# Patient Record
Sex: MALE | Race: WHITE | Employment: UNEMPLOYED | URBAN - METROPOLITAN AREA
[De-identification: names, ages, dates, MRNs, and addresses within clinical notes are randomized per-mention and may not be internally consistent; named-entity substitution may affect disease eponyms.]

---

## 2017-05-16 ENCOUNTER — HOSPITAL ENCOUNTER (EMERGENCY)
Age: 37
Discharge: HOME | End: 2017-05-16
Attending: EMERGENCY MEDICINE
Payer: SELF-PAY

## 2017-05-16 VITALS
RESPIRATION RATE: 14 BRPM | SYSTOLIC BLOOD PRESSURE: 109 MMHG | HEART RATE: 121 BPM | TEMPERATURE: 98.1 F | BODY MASS INDEX: 19.29 KG/M2 | DIASTOLIC BLOOD PRESSURE: 61 MMHG | HEIGHT: 66 IN | WEIGHT: 120 LBS | OXYGEN SATURATION: 96 %

## 2017-05-16 DIAGNOSIS — F19.10 DRUG ABUSE (HCC): Primary | ICD-10-CM

## 2017-05-16 LAB
ALBUMIN SERPL BCP-MCNC: 4 G/DL (ref 3.5–5)
ALBUMIN/GLOB SERPL: 1.4 {RATIO} (ref 1.2–3.5)
ALP SERPL-CCNC: 98 U/L (ref 50–136)
ALT SERPL-CCNC: 31 U/L (ref 12–65)
ANION GAP BLD CALC-SCNC: 12 MMOL/L (ref 7–16)
APAP SERPL-MCNC: 2 UG/ML (ref 10–30)
AST SERPL W P-5'-P-CCNC: 23 U/L (ref 15–37)
BASOPHILS # BLD AUTO: 0 K/UL (ref 0–0.2)
BASOPHILS # BLD: 0 % (ref 0–2)
BILIRUB SERPL-MCNC: 0.2 MG/DL (ref 0.2–1.1)
BUN SERPL-MCNC: 15 MG/DL (ref 6–23)
CALCIUM SERPL-MCNC: 9.6 MG/DL (ref 8.3–10.4)
CHLORIDE SERPL-SCNC: 98 MMOL/L (ref 98–107)
CO2 SERPL-SCNC: 26 MMOL/L (ref 21–32)
CREAT SERPL-MCNC: 1.39 MG/DL (ref 0.8–1.5)
DIFFERENTIAL METHOD BLD: ABNORMAL
EOSINOPHIL # BLD: 0.1 K/UL (ref 0–0.8)
EOSINOPHIL NFR BLD: 1 % (ref 0.5–7.8)
ERYTHROCYTE [DISTWIDTH] IN BLOOD BY AUTOMATED COUNT: 13 % (ref 11.9–14.6)
ETHANOL SERPL-MCNC: 220 MG/DL
GLOBULIN SER CALC-MCNC: 2.9 G/DL (ref 2.3–3.5)
GLUCOSE SERPL-MCNC: 508 MG/DL (ref 65–100)
HCT VFR BLD AUTO: 41.2 % (ref 41.1–50.3)
HGB BLD-MCNC: 14.4 G/DL (ref 13.6–17.2)
IMM GRANULOCYTES # BLD: 0.1 K/UL (ref 0–0.5)
IMM GRANULOCYTES NFR BLD AUTO: 0.3 % (ref 0–5)
LYMPHOCYTES # BLD AUTO: 17 % (ref 13–44)
LYMPHOCYTES # BLD: 3.1 K/UL (ref 0.5–4.6)
MCH RBC QN AUTO: 31.9 PG (ref 26.1–32.9)
MCHC RBC AUTO-ENTMCNC: 35 G/DL (ref 31.4–35)
MCV RBC AUTO: 91.2 FL (ref 79.6–97.8)
MONOCYTES # BLD: 1.5 K/UL (ref 0.1–1.3)
MONOCYTES NFR BLD AUTO: 8 % (ref 4–12)
NEUTS SEG # BLD: 13 K/UL (ref 1.7–8.2)
NEUTS SEG NFR BLD AUTO: 74 % (ref 43–78)
PLATELET # BLD AUTO: 249 K/UL (ref 150–450)
PMV BLD AUTO: 10.4 FL (ref 10.8–14.1)
POTASSIUM SERPL-SCNC: 4.7 MMOL/L (ref 3.5–5.1)
PROT SERPL-MCNC: 6.9 G/DL (ref 6.3–8.2)
RBC # BLD AUTO: 4.52 M/UL (ref 4.23–5.67)
SALICYLATES SERPL-MCNC: 2.8 MG/DL (ref 10–20)
SODIUM SERPL-SCNC: 136 MMOL/L (ref 136–145)
WBC # BLD AUTO: 17.8 K/UL (ref 4.3–11.1)

## 2017-05-16 PROCEDURE — 80053 COMPREHEN METABOLIC PANEL: CPT | Performed by: EMERGENCY MEDICINE

## 2017-05-16 PROCEDURE — 85025 COMPLETE CBC W/AUTO DIFF WBC: CPT | Performed by: EMERGENCY MEDICINE

## 2017-05-16 PROCEDURE — 80307 DRUG TEST PRSMV CHEM ANLYZR: CPT | Performed by: EMERGENCY MEDICINE

## 2017-05-16 PROCEDURE — 99281 EMR DPT VST MAYX REQ PHY/QHP: CPT | Performed by: EMERGENCY MEDICINE

## 2017-05-16 RX ORDER — SODIUM CHLORIDE 0.9 % (FLUSH) 0.9 %
5-10 SYRINGE (ML) INJECTION AS NEEDED
Status: DISCONTINUED | OUTPATIENT
Start: 2017-05-16 | End: 2017-05-16

## 2017-05-16 RX ORDER — SODIUM CHLORIDE 0.9 % (FLUSH) 0.9 %
5-10 SYRINGE (ML) INJECTION EVERY 8 HOURS
Status: DISCONTINUED | OUTPATIENT
Start: 2017-05-16 | End: 2017-05-16

## 2017-05-17 NOTE — ED NOTES
Patient deciding not to stay for treatment, so with the assistance of the [facility], patient was asked to leave the facility in an expeditious manner. Physician attempted to examine patient at 2300.

## 2017-05-17 NOTE — ED PROVIDER NOTES
HPI Comments: 76-year-old male brought in by his \"best friend and best friend's mother\" for anxiety and taking  Too much of his medications. He was just evaluated like [facility] in today. Family states that he took 13 Klonopin. Patient is now demanding more medications stating that his Sam Cockayne are tore up. \"  He told triage that he recently lost his parents, but apparently this was 2 years ago. His history of methamphetamine abuse. Denies any current suicidal or homicidal thoughts. Patient stating if he doesn't get any anxiety medicines he will leave. Police at the bedside. Patient is a 40 y.o. male presenting with anxiety. The history is provided by the patient, the police and a friend. The history is limited by the condition of the patient. Anxiety           Past Medical History:   Diagnosis Date    Anxiety     DM (diabetes mellitus) ([facility] [address]     Other ill-defined conditions     torn ligament to R LE       Past Surgical History:   Procedure Laterality Date    HX ORTHOPAEDIC      left leg         History reviewed. No pertinent family history. Social History     Social History    Marital status: SINGLE     Spouse name: N/A    Number of children: N/A    Years of education: N/A     Occupational History    Not on file. Social History Main Topics    Smoking status: Current Every Day Smoker     Packs/day: 1.00    Smokeless tobacco: Not on file    Alcohol use Yes    Drug use: No    Sexual activity: Not on file     Other Topics Concern    Not on file     Social History Narrative         ALLERGIES: Review of patient's allergies indicates no known allergies. Review of Systems   Unable to perform ROS: Other       Vitals:    05/16/17 2205   BP: 109/61   Pulse: (!) 121   Resp: 14   Temp: 98.1 °F (36.7 °C)   SpO2: 96%   Weight: 54.4 kg (120 lb)   Height: 5' 6\" (1.676 m)            Physical Exam   Constitutional: He appears well-developed and well-nourished.    HENT:   Head: Normocephalic and atraumatic. Right Ear: External ear normal.   Left Ear: External ear normal.   Nose: Nose normal.   Mouth/Throat: Oropharynx is clear and moist.   Eyes: Conjunctivae are normal. Pupils are equal, round, and reactive to light. Neck: Normal range of motion. Neck supple. Cardiovascular: Regular rhythm. Pulmonary/Chest: Effort normal. No respiratory distress. Abdominal: Soft. He exhibits no distension. Musculoskeletal: Normal range of motion. He exhibits no edema. Neurological: He is alert. Skin: Skin is warm and dry. Psychiatric: His speech is slurred. He is agitated, aggressive and combative. He expresses no homicidal and no suicidal ideation. Nursing note and vitals reviewed. MDM  Number of Diagnoses or Management Options  Diagnosis management comments: Parts of this document were created using dragon voice recognition software. The chart has been reviewed but errors may still be present. Patient agitated and demanding medications. Blood sugar elevated, but not in diabetic ketoacidosis. Offered medication, fluids, and insulin, the patient refused stating he wants to leave. Suspect methamphetamine intoxication. Patient escorted out of the emergency department by police.        Amount and/or Complexity of Data Reviewed  Clinical lab tests: ordered and reviewed (Results for orders placed or performed during the hospital encounter of 05/16/17  -CBC WITH AUTOMATED DIFF       Result                                            Value                         Ref Range                       WBC                                               17.8 (H)                      4.3 - 11.1 K/uL                 RBC                                               4.52                          4.23 - 5.67 M/uL                HGB                                               14.4                          13.6 - 17.2 g/dL                HCT                                               41.2 41.1 - 50.3 %                   MCV                                               91.2                          79.6 - 97.8 FL                  MCH                                               31.9                          26.1 - 32.9 PG                  MCHC                                              35.0                          31.4 - 35.0 g/dL                RDW                                               13.0                          11.9 - 14.6 %                   PLATELET                                          249                           150 - 450 K/uL                  MPV                                               10.4 (L)                      10.8 - 14.1 FL                  DF                                                AUTOMATED                                                     NEUTROPHILS                                       74                            43 - 78 %                       LYMPHOCYTES                                       17                            13 - 44 %                       MONOCYTES                                         8                             4.0 - 12.0 %                    EOSINOPHILS                                       1                             0.5 - 7.8 %                     BASOPHILS                                         0                             0.0 - 2.0 %                     IMMATURE GRANULOCYTES                             0.3                           0.0 - 5.0 %                     ABS. NEUTROPHILS                                  13.0 (H)                      1.7 - 8.2 K/UL                  ABS. LYMPHOCYTES                                  3.1                           0.5 - 4.6 K/UL                  ABS. MONOCYTES                                    1.5 (H)                       0.1 - 1.3 K/UL                  ABS.  EOSINOPHILS                                  0.1                           0.0 - 0.8 K/UL ABS. BASOPHILS                                    0.0                           0.0 - 0.2 K/UL                  ABS. IMM.  GRANS.                                  0.1                           0.0 - 0.5 K/UL             -METABOLIC PANEL, COMPREHENSIVE       Result                                            Value                         Ref Range                       Sodium                                            136                           136 - 145 mmol/L                Potassium                                         4.7                           3.5 - 5.1 mmol/L                Chloride                                          98                            98 - 107 mmol/L                 CO2                                               26                            21 - 32 mmol/L                  Anion gap                                         12                            7 - 16 mmol/L                   Glucose                                           508 (HH)                      65 - 100 mg/dL                  BUN                                               15                            6 - 23 MG/DL                    Creatinine                                        1.39                          0.8 - 1.5 MG/DL                 GFR est AA                                        >60                           >60 ml/min/1.73m2               GFR est non-AA                                    >60                           >60 ml/min/1.73m2               Calcium                                           9.6                           8.3 - 10.4 MG/DL                Bilirubin, total                                  0.2                           0.2 - 1.1 MG/DL                 ALT (SGPT)                                        31                            12 - 65 U/L                     AST (SGOT)                                        23                            15 - 37 U/L                     Alk. phosphatase                                  98                            50 - 136 U/L                    Protein, total                                    6.9                           6.3 - 8.2 g/dL                  Albumin                                           4.0                           3.5 - 5.0 g/dL                  Globulin                                          2.9                           2.3 - 3.5 g/dL                  A-G Ratio                                         1.4                           1.2 - 3.5                  -ETHYL ALCOHOL       Result                                            Value                         Ref Range                       ALCOHOL(ETHYL),SERUM                              220                           MG/DL                      -ACETAMINOPHEN       Result                                            Value                         Ref Range                       ACETAMINOPHEN                                     2 (L)                         10.0 - 30.0 ug/mL          -SALICYLATE       Result                                            Value                         Ref Range                       SALICYLATE                                        2.8 (L)                       10 - 20 MG/DL              )      ED Course       Procedures

## 2017-05-17 NOTE — ED TRIAGE NOTES
Patient presents stating he has been having an anxiety attack and his klonopin is not working. His friend stated he took 15 pills. He has also been taking hydrocodone.  He was brought her from [facility].